# Patient Record
Sex: FEMALE | Race: OTHER | Employment: UNEMPLOYED | URBAN - METROPOLITAN AREA
[De-identification: names, ages, dates, MRNs, and addresses within clinical notes are randomized per-mention and may not be internally consistent; named-entity substitution may affect disease eponyms.]

---

## 2019-10-24 ENCOUNTER — HOSPITAL ENCOUNTER (OUTPATIENT)
Dept: LAB | Age: 24
Discharge: HOME | End: 2019-10-24

## 2019-10-24 PROCEDURE — 87624 HPV HI-RISK TYP POOLED RSLT: CPT

## 2019-10-24 PROCEDURE — 88175 CYTOPATH C/V AUTO FLUID REDO: CPT

## 2019-10-25 ENCOUNTER — HOSPITAL ENCOUNTER (OUTPATIENT)
Dept: LAB | Age: 24
Discharge: HOME | End: 2019-10-25

## 2019-10-25 PROCEDURE — 87491 CHLMYD TRACH DNA AMP PROBE: CPT

## 2019-10-28 LAB
C TRACH DNA SPEC QL NAA+PROBE: NEGATIVE
N GONORRHOEA DNA SPEC QL NAA+PROBE: NEGATIVE
SAMPLE TYPE: NORMAL
SERVICE CMNT-IMP: NORMAL
SPECIMEN SOURCE: NORMAL

## 2020-10-20 ENCOUNTER — HOSPITAL ENCOUNTER (OUTPATIENT)
Dept: LAB | Age: 25
Discharge: HOME | End: 2020-10-20

## 2020-10-20 LAB
ALBUMIN SERPL-MCNC: 3.4 G/DL (ref 3.5–5)
ALBUMIN/GLOB SERPL: 1.1 {RATIO} (ref 1.1–2.2)
ALP SERPL-CCNC: 69 U/L (ref 45–117)
ALT SERPL-CCNC: 24 U/L (ref 12–78)
ANION GAP SERPL CALC-SCNC: 10 MMOL/L (ref 5–15)
AST SERPL-CCNC: 15 U/L (ref 15–37)
BASOPHILS # BLD: 0 K/UL (ref 0–0.1)
BASOPHILS NFR BLD: 1 % (ref 0–1)
BILIRUB SERPL-MCNC: 0.3 MG/DL (ref 0.2–1)
BUN SERPL-MCNC: 12 MG/DL (ref 6–20)
BUN/CREAT SERPL: 17 (ref 12–20)
CALCIUM SERPL-MCNC: 9 MG/DL (ref 8.5–10.1)
CHLORIDE SERPL-SCNC: 107 MMOL/L (ref 97–108)
CO2 SERPL-SCNC: 25 MMOL/L (ref 21–32)
CREAT SERPL-MCNC: 0.71 MG/DL (ref 0.55–1.02)
CRP SERPL HS-MCNC: >9.5 MG/L
DIFFERENTIAL METHOD BLD: NORMAL
EOSINOPHIL # BLD: 0.4 K/UL (ref 0–0.4)
EOSINOPHIL NFR BLD: 7 % (ref 0–7)
ERYTHROCYTE [DISTWIDTH] IN BLOOD BY AUTOMATED COUNT: 13 % (ref 11.5–14.5)
GLOBULIN SER CALC-MCNC: 3.1 G/DL (ref 2–4)
GLUCOSE SERPL-MCNC: 108 MG/DL (ref 65–100)
HCT VFR BLD AUTO: 42.1 % (ref 35–47)
HGB BLD-MCNC: 13.9 G/DL (ref 11.5–16)
IMM GRANULOCYTES # BLD AUTO: 0 K/UL (ref 0–0.04)
IMM GRANULOCYTES NFR BLD AUTO: 0 % (ref 0–0.5)
LYMPHOCYTES # BLD: 2.5 K/UL (ref 0.8–3.5)
LYMPHOCYTES NFR BLD: 41 % (ref 12–49)
MCH RBC QN AUTO: 30.9 PG (ref 26–34)
MCHC RBC AUTO-ENTMCNC: 33 G/DL (ref 30–36.5)
MCV RBC AUTO: 93.6 FL (ref 80–99)
MONOCYTES # BLD: 0.4 K/UL (ref 0–1)
MONOCYTES NFR BLD: 7 % (ref 5–13)
NEUTS SEG # BLD: 2.6 K/UL (ref 1.8–8)
NEUTS SEG NFR BLD: 44 % (ref 32–75)
NRBC # BLD: 0 K/UL (ref 0–0.01)
NRBC BLD-RTO: 0 PER 100 WBC
PLATELET # BLD AUTO: 233 K/UL (ref 150–400)
PMV BLD AUTO: 10.9 FL (ref 8.9–12.9)
POTASSIUM SERPL-SCNC: 3.7 MMOL/L (ref 3.5–5.1)
PROT SERPL-MCNC: 6.5 G/DL (ref 6.4–8.2)
RBC # BLD AUTO: 4.5 M/UL (ref 3.8–5.2)
SODIUM SERPL-SCNC: 142 MMOL/L (ref 136–145)
WBC # BLD AUTO: 6 K/UL (ref 3.6–11)

## 2020-10-20 PROCEDURE — 86038 ANTINUCLEAR ANTIBODIES: CPT

## 2020-10-20 PROCEDURE — 86141 C-REACTIVE PROTEIN HS: CPT

## 2020-10-20 PROCEDURE — 85652 RBC SED RATE AUTOMATED: CPT

## 2020-10-20 PROCEDURE — 85025 COMPLETE CBC W/AUTO DIFF WBC: CPT

## 2020-10-20 PROCEDURE — 80053 COMPREHEN METABOLIC PANEL: CPT

## 2020-10-21 LAB — ERYTHROCYTE [SEDIMENTATION RATE] IN BLOOD: 7 MM/HR (ref 0–20)

## 2020-10-22 LAB — ANA SER QL: NEGATIVE

## 2021-12-30 LAB
ANTIBODY SCREEN, EXTERNAL: NEGATIVE
HBSAG, EXTERNAL: NEGATIVE
RPR, EXTERNAL: NORMAL
RUBELLA, EXTERNAL: NORMAL
TYPE, ABO & RH, EXTERNAL: NORMAL

## 2022-01-06 LAB — HIV, EXTERNAL: NORMAL

## 2022-01-20 ENCOUNTER — HOSPITAL ENCOUNTER (OUTPATIENT)
Dept: PERINATAL CARE | Age: 27
Discharge: HOME | End: 2022-01-20
Attending: OBSTETRICS & GYNECOLOGY

## 2022-01-20 PROCEDURE — 76805 OB US >/= 14 WKS SNGL FETUS: CPT | Performed by: OBSTETRICS & GYNECOLOGY

## 2022-02-24 ENCOUNTER — HOSPITAL ENCOUNTER (OUTPATIENT)
Dept: PERINATAL CARE | Age: 27
Discharge: HOME | End: 2022-02-24
Attending: OBSTETRICS & GYNECOLOGY
Payer: MEDICAID

## 2022-02-24 PROCEDURE — 76805 OB US >/= 14 WKS SNGL FETUS: CPT | Performed by: OBSTETRICS & GYNECOLOGY

## 2022-03-02 PROCEDURE — 87086 URINE CULTURE/COLONY COUNT: CPT

## 2022-03-03 ENCOUNTER — HOSPITAL ENCOUNTER (OUTPATIENT)
Dept: LAB | Age: 27
Discharge: HOME | End: 2022-03-03

## 2022-03-05 LAB
BACTERIA SPEC CULT: NORMAL
SERVICE CMNT-IMP: NORMAL

## 2022-06-22 LAB — GRBS, EXTERNAL: NEGATIVE

## 2022-06-27 ENCOUNTER — INITIAL PRENATAL (OUTPATIENT)
Dept: FAMILY MEDICINE CLINIC | Age: 27
End: 2022-06-27
Payer: MEDICAID

## 2022-06-27 ENCOUNTER — TELEPHONE (OUTPATIENT)
Dept: FAMILY MEDICINE CLINIC | Age: 27
End: 2022-06-27

## 2022-06-27 ENCOUNTER — HOSPITAL ENCOUNTER (OUTPATIENT)
Dept: PERINATAL CARE | Age: 27
Discharge: HOME | End: 2022-06-27
Attending: OBSTETRICS & GYNECOLOGY
Payer: MEDICAID

## 2022-06-27 VITALS
TEMPERATURE: 98.4 F | DIASTOLIC BLOOD PRESSURE: 63 MMHG | WEIGHT: 153 LBS | HEART RATE: 97 BPM | HEIGHT: 60 IN | BODY MASS INDEX: 30.04 KG/M2 | RESPIRATION RATE: 16 BRPM | SYSTOLIC BLOOD PRESSURE: 98 MMHG | OXYGEN SATURATION: 98 %

## 2022-06-27 DIAGNOSIS — Z3A.37 37 WEEKS GESTATION OF PREGNANCY: Primary | ICD-10-CM

## 2022-06-27 DIAGNOSIS — O26.843 UTERINE SIZE-DATE DISCREPANCY IN THIRD TRIMESTER: ICD-10-CM

## 2022-06-27 PROCEDURE — 76816 OB US FOLLOW-UP PER FETUS: CPT | Performed by: OBSTETRICS & GYNECOLOGY

## 2022-06-27 PROCEDURE — 0500F INITIAL PRENATAL CARE VISIT: CPT | Performed by: STUDENT IN AN ORGANIZED HEALTH CARE EDUCATION/TRAINING PROGRAM

## 2022-06-27 NOTE — PROGRESS NOTES
I reviewed with the resident the medical history and the resident's findings on the physical examination. I discussed with the resident the patient's diagnosis and concur with the plan. 27yo  @ 37w0d   1. IUP: Rh pos, GTT wnl, GBS neg, vx by paola   2. Transfer of care: at 37 wk from [facility]  3. S<D: had MFM scan today, pending, will f/up on that.   Apparently her two other children were both 10#

## 2022-06-27 NOTE — PROGRESS NOTES
Identified pt with two pt identifiers(name and ). Reviewed record in preparation for visit and have obtained necessary documentation. Chief Complaint   Patient presents with    Initial Prenatal Visit     , no bleeding, no lof, patient states she feels baby move alot        Health Maintenance Due   Topic    Hepatitis C Screening     Depression Screen     COVID-19 Vaccine (1)    DTaP/Tdap/Td series (1 - Tdap)       Visit Vitals  BP 98/63 (BP 1 Location: Left upper arm, BP Patient Position: Sitting, BP Cuff Size: Adult)   Pulse 97   Temp 98.4 °F (36.9 °C) (Temporal)   Resp 16   Ht 5' (1.524 m)   Wt 153 lb (69.4 kg)   SpO2 98%   BMI 29.88 kg/m²             Patient is accompanied by self I have received verbal consent from Kasey Cogan to discuss any/all medical information while they are present in the room.

## 2022-06-27 NOTE — PROGRESS NOTES
Return OB Visit       Subjective:   Annmarie Padgett 27 y.o.   DANIELA: 2022. GA:  37w0d by LMP c/w 14 week US. AMN interp# [id number] used due to language barrier. LOF: none  Vaginal bleeding: none  Fetal movement (after 20 weeks): yes   Contractions: intermittent, sporadic     She has no concerns today. She is transferring care from [facility]. Her pregnancy has been complicated by S<D. She was referred for an ultrasound at [facility] today and was told the baby looks normal and to follow up prn. Otherwise her pregnancy has been uncomplicated. Allergies- reviewed:   No Known Allergies  Medications- reviewed:   Current Outpatient Medications   Medication Sig    PNV TQ.02/ARWOQFX fum/folic ac (PRENATAL PO) Take  by mouth. No current facility-administered medications for this visit. Past Medical History- reviewed:  History reviewed. No pertinent past medical history. Past Surgical History- reviewed:   History reviewed. No pertinent surgical history. Social History- reviewed:  Social History     Socioeconomic History    Marital status: UNKNOWN     Spouse name: Not on file    Number of children: Not on file    Years of education: Not on file    Highest education level: Not on file   Occupational History    Not on file   Tobacco Use    Smoking status: Never Smoker    Smokeless tobacco: Never Used   Substance and Sexual Activity    Alcohol use: Never    Drug use: Never    Sexual activity: Yes     Partners: Male   Other Topics Concern    Not on file   Social History Narrative    Not on file     Social Determinants of Health     Financial Resource Strain:     Difficulty of Paying Living Expenses: Not on file   Food Insecurity:     Worried About Running Out of Food in the Last Year: Not on file    Tanja of Food in the Last Year: Not on file   Transportation Needs:     Lack of Transportation (Medical): Not on file    Lack of Transportation (Non-Medical):  Not on file Physical Activity:     Days of Exercise per Week: Not on file    Minutes of Exercise per Session: Not on file   Stress:     Feeling of Stress : Not on file   Social Connections:     Frequency of Communication with Friends and Family: Not on file    Frequency of Social Gatherings with Friends and Family: Not on file    Attends Druze Services: Not on file    Active Member of [address] [place] [address] or Organizations: Not on file    Attends Club or Organization Meetings: Not on file    Marital Status: Not on file   Intimate Partner Violence:     Fear of Current or Ex-Partner: Not on file    Emotionally Abused: Not on file    Physically Abused: Not on file    Sexually Abused: Not on file   Housing Stability:     Unable to Pay for Housing in the Last Year: Not on file    Number of [place] in the Last Year: Not on file    Unstable Housing in the Last Year: Not on file     Immunizations- reviewed:   Immunization History   Administered Date(s) Administered    COVID-19, Moderna Booster, PF, 0.25mL Dose 2022    COVID-19, Marvin George, Primary or Immunocompromised Series, MRNA, PF, 100mcg/0.5mL 10/28/2021, 2021    Tdap 2022       Objective:     Visit Vitals  BP 98/63 (BP 1 Location: Left upper arm, BP Patient Position: Sitting, BP Cuff Size: Adult)   Pulse 97   Temp 98.4 °F (36.9 °C) (Temporal)   Resp 16   Ht 5' (1.524 m)   Wt 153 lb (69.4 kg)   LMP 10/11/2021 (Exact Date)   SpO2 98%   BMI 29.88 kg/m²     Physical Exam:  GENERAL APPEARANCE: alert, well appearing, in no apparent distress  ABDOMEN: gravid, fundal height 34 cm, FHT present at 135 bpm  PSYCH: normal mood and affect           Labs  No results found for this or any previous visit (from the past 12 hour(s)). Assessment       ICD-10-CM ICD-9-CM    1. 37 weeks gestation of pregnancy  Z3A.37 V22.2    2.  Uterine size-date discrepancy in third trimester  O26.843 649.63        Plan   27 y.o.  at 37w0d by LMP c/w 14 week US. here for return OB visit     1. IUP: O+, Ab screen neg, hep B neg, RPR NR, HIV NR, GC/Chlamydia neg, Hg 13.5, platelets 963, ur culture neg, AFP neg. 3rd tri Hg 12.8, 1hr GTT normal, pap 12/2021 normal  - Patient [facility] labs and paperwork [facility]. The forms have been placed in the in-house scan pile   - Previously declined genetic testing  - Cephalic on bedside US today, will defer cervical check until next visit  - GBS: neg   - Continue PNV  2. S<D:   - Growth scan done today and results scanned in to media, baby is 38th percentile and everything appears normal. No follow up is recommended      Orders Placed This Encounter    PNV KD.56/MLTPKLC fum/folic ac (PRENATAL PO)     Sig: Take  by mouth. Labor precautions discussed, including: Regular painful contractions, lasting for greater than one hour, taking your breath away; any vaginal bleeding; any leakage of fluid; or absent or decreased fetal movement. Call M.D. on call if any of these symptoms or signs occur. I have discussed the diagnosis with the patient and the intended plan as seen in the above orders. The patient has received an after-visit summary and questions were answered concerning future plans. I have discussed medication side effects and warnings with the patient as well. Informed pt to return to the office or go to the ER if she experiences vaginal bleeding, vaginal discharge, leaking of fluid, pelvic cramping.     Patient discussed with Dr. Mallory Nguyen (attending physician)    Mitali Allison DO  Family Medicine Resident

## 2022-06-27 NOTE — PATIENT INSTRUCTIONS
Semana 40 de mullins embarazo: Instrucciones de cuidado  Week 37 of Your Pregnancy: Care Instructions  Instrucciones de cuidado     Usted está cerca del final de mullins embarazo, y probablemente esté bastante incómoda. Puede ser más difícil caminar. Acostarse probablemente tampoco sea cómodo. Podría tener dificultad para dormir o para permanecer dormida. La mayoría de las mujeres ahsan a bao entre las 40 y 43 semanas. Alisa es un buen momento para pensar en preparar un maletín para el hospital con los artículos que necesitará. Entonces estará lista para cuando comience el [place] get Allison. La atención de seguimiento es loren parte clave de mullins tratamiento y seguridad. Asegúrese de hacer y acudir a todas las citas, y llame a mullins médico si está teniendo problemas. También es loren buena idea saber los resultados de jax exámenes y mantener loren lista de los medicamentos que elan. ¿Cómo puede cuidarse en el hogar? Aprenda sobre el amamantamiento  · El amamantamiento es lo mejor para mullins bebé y [place] es kelley para usted. · La leche materna tiene anticuerpos que ayudan al bebé a combatir las infecciones. · Las madres que amamantan suelen bajar de peso más rápidamente, debido a que elaborar leche quema calorías. · Informarse acerca de las mejores maneras de sostener a mullins bebé le facilitará el amamantamiento. · Deje que mullins michelle bañe y [employer] pañales del bebé para que no se sienta excluida. Acurrúquense juntos cuando amamante a mullins bebé. · Es posible que desee aprender a usar un sacaleches y guardar mullins [facility]. · Si elige alimentar a mullins bebé con biberón, hágalo de la [employer] resulte más parecida al amamantamiento para que pueda establecer un vínculo con mullins bebé. Siempre sostenga al bebé y el biberón. No apoye el biberón ni deje que mullins bebé se quede dormido con él. Aprenda sobre el llanto  · Es normal que los bebés lloren de 1 a 3 horas al día. Algunos lloran más, otros menos.   · Los bebés no lloran para causarle molestias ni porque usted sea [facility]. · Llorar es la forma de comunicarse que tiene el bebé. Mullins bebé puede [employer] o gases, necesitar un cambio de pañal, sentir frío o calor, sentirse solo o tenso. A veces, los bebés lloran por motivos desconocidos. · Si usted responde a las necesidades de mullins bebé, mary aprenderá a confiar en usted. · Intente mantener la calma cuando mlulins bebé llore. Mullins bebé se puede sentir más molesto si siente que usted [place]. Sepa cómo cuidar a mullins recién nacido  · El muñón del cordón umbilical de mullins bebé se caerá solo, por lo general entre las semanas 1 y 2. Para cuidar la jamee del cordón umbilical de mullins bebé:  ? Limpie la jamee de la parte inferior del cordón umbilical 2 o 3 veces al día. ? Ponga especial atención en la jamee en donde el cordón se fija a la piel. ? Mantenga el pañal doblado debajo del cordón. ? Utilice loren toallita húmeda o algodón para darle un baño de esponja a mullins bebé hasta que se le haya caído el muñón. · La primera evacuación intestinal oscura de mullins bebé se conoce abimael meconio. Después del meconio, el bebé tendrá jax propios hábitos de evacuación intestinal.  ? Algunos bebés, especialmente aquellos que se alimentan con [address], tienen varias evacuaciones al día. Otros tienen [employer] al día, o loren cada 2 o 3 días. ? Los bebés que son amamantados a menudo tienen evacuaciones sueltas amarillentas. Los bebés que se alimentan con leche de fórmula evacuan heces más sólidas. ? Si mullins bebé tiene evacuaciones abimael bolitas pequeñas, está estreñido. Después de 2 zoran de estreñimiento, llame al médico de mullins bebé. · Si mullins bebé va a ser [employer], usted puede cuidarlo en el hogar. ? Enjuáguele delicadamente el pene con agua tibia cada vez que le cambie los pañales. No intente retirar la membrana que se forma sobre el pene. Esta membrana desaparecerá por sí antonina. Séquele la jamee con toques suaves de toalla.   ? [employer] a base de petróleo, abimael vaselina, en la jamee del pañal que tendrá contacto con el pene de mullins bebé. [place] evitará que el pañal se le pegue al bebé. ? Pregúntele al médico acerca de darle acetaminofén (Tylenol) a mullins bebé para el dolor. ¿Dónde puede encontrar más información en inglés? Vaya a http://www.Mocavo.com/  Natalia Chen [id number] en la búsqueda para aprender más acerca de \"Semana 40 de mullins embarazo: Instrucciones de cuidado. \"  Revisado: 16 junio, 2021               Versión del contenido: 13.2  © 5602-0773 Healthwise, Incorporated. Las instrucciones de cuidado fueron adaptadas bajo licencia por [employer] (which disclaims liability or warranty for this information). Si usted tiene Sampson Vernal afección médica o sobre estas instrucciones, siempre pregunte a mullins profesional de ulises. Healthwise, Incorporated niega toda garantía o responsabilidad por mullins uso de esta información.

## 2022-06-27 NOTE — TELEPHONE ENCOUNTER
Pt called will be late 10-15mins from appt time. On her way now, but it will take little time due to rain.  Ask nurse more and confirms its fine to come in 15-20 mins late as long as its not no show      -Verona Iraheta

## 2022-07-03 ENCOUNTER — HOSPITAL ENCOUNTER (INPATIENT)
Age: 27
LOS: 2 days | Discharge: HOME | DRG: 542 | End: 2022-07-05
Attending: FAMILY MEDICINE | Admitting: OBSTETRICS & GYNECOLOGY
Payer: MEDICAID

## 2022-07-03 DIAGNOSIS — O26.843 UTERINE SIZE-DATE DISCREPANCY IN THIRD TRIMESTER: ICD-10-CM

## 2022-07-03 LAB
ABO + RH BLD: NORMAL
APTT PPP: 28.8 SEC (ref 22.1–31)
BASOPHILS # BLD: 0 K/UL (ref 0–0.1)
BASOPHILS # BLD: 0 K/UL (ref 0–0.1)
BASOPHILS NFR BLD: 0 % (ref 0–1)
BASOPHILS NFR BLD: 0 % (ref 0–1)
BLOOD GROUP ANTIBODIES SERPL: NORMAL
DIFFERENTIAL METHOD BLD: ABNORMAL
DIFFERENTIAL METHOD BLD: ABNORMAL
EOSINOPHIL # BLD: 0 K/UL (ref 0–0.4)
EOSINOPHIL # BLD: 0.1 K/UL (ref 0–0.4)
EOSINOPHIL NFR BLD: 0 % (ref 0–7)
EOSINOPHIL NFR BLD: 1 % (ref 0–7)
ERYTHROCYTE [DISTWIDTH] IN BLOOD BY AUTOMATED COUNT: 14.4 % (ref 11.5–14.5)
ERYTHROCYTE [DISTWIDTH] IN BLOOD BY AUTOMATED COUNT: 14.4 % (ref 11.5–14.5)
FIBRINOGEN PPP-MCNC: 355 MG/DL (ref 200–475)
HCT VFR BLD AUTO: 37.4 % (ref 35–47)
HCT VFR BLD AUTO: 39 % (ref 35–47)
HGB BLD-MCNC: 13.1 G/DL (ref 11.5–16)
HGB BLD-MCNC: 13.7 G/DL (ref 11.5–16)
IMM GRANULOCYTES # BLD AUTO: 0.1 K/UL (ref 0–0.04)
IMM GRANULOCYTES # BLD AUTO: 0.1 K/UL (ref 0–0.04)
IMM GRANULOCYTES NFR BLD AUTO: 1 % (ref 0–0.5)
IMM GRANULOCYTES NFR BLD AUTO: 1 % (ref 0–0.5)
INR PPP: 0.9 (ref 0.9–1.1)
LYMPHOCYTES # BLD: 1.9 K/UL (ref 0.8–3.5)
LYMPHOCYTES # BLD: 2.3 K/UL (ref 0.8–3.5)
LYMPHOCYTES NFR BLD: 12 % (ref 12–49)
LYMPHOCYTES NFR BLD: 23 % (ref 12–49)
MCH RBC QN AUTO: 31.3 PG (ref 26–34)
MCH RBC QN AUTO: 31.4 PG (ref 26–34)
MCHC RBC AUTO-ENTMCNC: 35 G/DL (ref 30–36.5)
MCHC RBC AUTO-ENTMCNC: 35.1 G/DL (ref 30–36.5)
MCV RBC AUTO: 89.2 FL (ref 80–99)
MCV RBC AUTO: 89.3 FL (ref 80–99)
MONOCYTES # BLD: 0.7 K/UL (ref 0–1)
MONOCYTES # BLD: 0.9 K/UL (ref 0–1)
MONOCYTES NFR BLD: 6 % (ref 5–13)
MONOCYTES NFR BLD: 8 % (ref 5–13)
NEUTS SEG # BLD: 13.6 K/UL (ref 1.8–8)
NEUTS SEG # BLD: 6.4 K/UL (ref 1.8–8)
NEUTS SEG NFR BLD: 67 % (ref 32–75)
NEUTS SEG NFR BLD: 81 % (ref 32–75)
NRBC # BLD: 0 K/UL (ref 0–0.01)
NRBC # BLD: 0 K/UL (ref 0–0.01)
NRBC BLD-RTO: 0 PER 100 WBC
NRBC BLD-RTO: 0 PER 100 WBC
PLATELET # BLD AUTO: 195 K/UL (ref 150–400)
PLATELET # BLD AUTO: 201 K/UL (ref 150–400)
PMV BLD AUTO: 10 FL (ref 8.9–12.9)
PMV BLD AUTO: 10 FL (ref 8.9–12.9)
PROTHROMBIN TIME: 9.9 SEC (ref 9–11.1)
RBC # BLD AUTO: 4.19 M/UL (ref 3.8–5.2)
RBC # BLD AUTO: 4.37 M/UL (ref 3.8–5.2)
SPECIMEN EXP DATE BLD: NORMAL
THERAPEUTIC RANGE,PTTT: NORMAL SECS (ref 58–77)
WBC # BLD AUTO: 16.6 K/UL (ref 3.6–11)
WBC # BLD AUTO: 9.6 K/UL (ref 3.6–11)

## 2022-07-03 PROCEDURE — 0W3R7ZZ CONTROL BLEEDING IN GENITOURINARY TRACT, VIA NATURAL OR ARTIFICIAL OPENING: ICD-10-PCS | Performed by: NURSE PRACTITIONER

## 2022-07-03 PROCEDURE — 85025 COMPLETE CBC W/AUTO DIFF WBC: CPT

## 2022-07-03 PROCEDURE — 74011000250 HC RX REV CODE- 250: Performed by: OBSTETRICS & GYNECOLOGY

## 2022-07-03 PROCEDURE — 75410000002 HC LABOR FEE PER 1 HR: Performed by: NURSE PRACTITIONER

## 2022-07-03 PROCEDURE — 77030040831 HC BAG URINE DRNG MDII -A

## 2022-07-03 PROCEDURE — 75410000000 HC DELIVERY VAGINAL/SINGLE: Performed by: NURSE PRACTITIONER

## 2022-07-03 PROCEDURE — 85730 THROMBOPLASTIN TIME PARTIAL: CPT

## 2022-07-03 PROCEDURE — 75410000003 HC RECOV DEL/VAG/CSECN EA 0.5 HR: Performed by: NURSE PRACTITIONER

## 2022-07-03 PROCEDURE — 59025 FETAL NON-STRESS TEST: CPT

## 2022-07-03 PROCEDURE — 85610 PROTHROMBIN TIME: CPT

## 2022-07-03 PROCEDURE — 86900 BLOOD TYPING SEROLOGIC ABO: CPT

## 2022-07-03 PROCEDURE — 75810000275 HC EMERGENCY DEPT VISIT NO LEVEL OF CARE

## 2022-07-03 PROCEDURE — 3E033VJ INTRODUCTION OF OTHER HORMONE INTO PERIPHERAL VEIN, PERCUTANEOUS APPROACH: ICD-10-PCS | Performed by: NURSE PRACTITIONER

## 2022-07-03 PROCEDURE — 85384 FIBRINOGEN ACTIVITY: CPT

## 2022-07-03 PROCEDURE — 74011000250 HC RX REV CODE- 250: Performed by: NURSE PRACTITIONER

## 2022-07-03 PROCEDURE — 74011250637 HC RX REV CODE- 250/637: Performed by: OBSTETRICS & GYNECOLOGY

## 2022-07-03 PROCEDURE — 77030005513 HC CATH URETH FOL11 MDII -B

## 2022-07-03 PROCEDURE — 36415 COLL VENOUS BLD VENIPUNCTURE: CPT

## 2022-07-03 PROCEDURE — 99284 EMERGENCY DEPT VISIT MOD MDM: CPT

## 2022-07-03 PROCEDURE — C1726 CATH, BAL DIL, NON-VASCULAR: HCPCS

## 2022-07-03 PROCEDURE — 65270000029 HC RM PRIVATE

## 2022-07-03 PROCEDURE — 65410000002 HC RM PRIVATE OB

## 2022-07-03 PROCEDURE — 74011250636 HC RX REV CODE- 250/636: Performed by: NURSE PRACTITIONER

## 2022-07-03 PROCEDURE — 74011250637 HC RX REV CODE- 250/637: Performed by: STUDENT IN AN ORGANIZED HEALTH CARE EDUCATION/TRAINING PROGRAM

## 2022-07-03 PROCEDURE — 74011250636 HC RX REV CODE- 250/636: Performed by: STUDENT IN AN ORGANIZED HEALTH CARE EDUCATION/TRAINING PROGRAM

## 2022-07-03 RX ORDER — SODIUM CHLORIDE, SODIUM LACTATE, POTASSIUM CHLORIDE, CALCIUM CHLORIDE 600; 310; 30; 20 MG/100ML; MG/100ML; MG/100ML; MG/100ML
125 INJECTION, SOLUTION INTRAVENOUS CONTINUOUS
Status: CANCELLED | OUTPATIENT
Start: 2022-07-03

## 2022-07-03 RX ORDER — FENTANYL CITRATE 50 UG/ML
50 INJECTION, SOLUTION INTRAMUSCULAR; INTRAVENOUS ONCE
Status: COMPLETED | OUTPATIENT
Start: 2022-07-03 | End: 2022-07-03

## 2022-07-03 RX ORDER — ONDANSETRON 2 MG/ML
4 INJECTION INTRAMUSCULAR; INTRAVENOUS
Status: DISCONTINUED | OUTPATIENT
Start: 2022-07-03 | End: 2022-07-04 | Stop reason: HOSPADM

## 2022-07-03 RX ORDER — OXYTOCIN/RINGER'S LACTATE 30/500 ML
10 PLASTIC BAG, INJECTION (ML) INTRAVENOUS AS NEEDED
Status: DISCONTINUED | OUTPATIENT
Start: 2022-07-03 | End: 2022-07-05 | Stop reason: HOSPADM

## 2022-07-03 RX ORDER — FENTANYL CITRATE 50 UG/ML
50 INJECTION, SOLUTION INTRAMUSCULAR; INTRAVENOUS
Status: COMPLETED | OUTPATIENT
Start: 2022-07-03 | End: 2022-07-03

## 2022-07-03 RX ORDER — OXYTOCIN/RINGER'S LACTATE 30/500 ML
87.3 PLASTIC BAG, INJECTION (ML) INTRAVENOUS AS NEEDED
Status: CANCELLED | OUTPATIENT
Start: 2022-07-03

## 2022-07-03 RX ORDER — OXYTOCIN/RINGER'S LACTATE 30/500 ML
PLASTIC BAG, INJECTION (ML) INTRAVENOUS
Status: DISPENSED
Start: 2022-07-03 | End: 2022-07-04

## 2022-07-03 RX ORDER — OXYTOCIN/RINGER'S LACTATE 30/500 ML
87.3 PLASTIC BAG, INJECTION (ML) INTRAVENOUS AS NEEDED
Status: DISCONTINUED | OUTPATIENT
Start: 2022-07-03 | End: 2022-07-05 | Stop reason: HOSPADM

## 2022-07-03 RX ORDER — TRANEXAMIC ACID 100 MG/ML
1 INJECTION, SOLUTION INTRAVENOUS ONCE
Status: COMPLETED | OUTPATIENT
Start: 2022-07-03 | End: 2022-07-03

## 2022-07-03 RX ORDER — OXYTOCIN/RINGER'S LACTATE 30/500 ML
10 PLASTIC BAG, INJECTION (ML) INTRAVENOUS AS NEEDED
Status: CANCELLED | OUTPATIENT
Start: 2022-07-03

## 2022-07-03 RX ORDER — SODIUM CHLORIDE 0.9 % (FLUSH) 0.9 %
5-40 SYRINGE (ML) INJECTION AS NEEDED
Status: DISCONTINUED | OUTPATIENT
Start: 2022-07-03 | End: 2022-07-05 | Stop reason: HOSPADM

## 2022-07-03 RX ORDER — HYDROCORTISONE ACETATE PRAMOXINE HCL 2.5; 1 G/100G; G/100G
CREAM TOPICAL AS NEEDED
Status: DISCONTINUED | OUTPATIENT
Start: 2022-07-03 | End: 2022-07-03

## 2022-07-03 RX ORDER — MAG HYDROX/ALUMINUM HYD/SIMETH 200-200-20
30 SUSPENSION, ORAL (FINAL DOSE FORM) ORAL
Status: DISCONTINUED | OUTPATIENT
Start: 2022-07-03 | End: 2022-07-04 | Stop reason: HOSPADM

## 2022-07-03 RX ORDER — SODIUM CHLORIDE 0.9 % (FLUSH) 0.9 %
5-40 SYRINGE (ML) INJECTION AS NEEDED
Status: CANCELLED | OUTPATIENT
Start: 2022-07-03

## 2022-07-03 RX ORDER — IBUPROFEN 800 MG/1
800 TABLET ORAL EVERY 8 HOURS
Status: DISCONTINUED | OUTPATIENT
Start: 2022-07-03 | End: 2022-07-04 | Stop reason: HOSPADM

## 2022-07-03 RX ORDER — MISOPROSTOL 200 UG/1
800 TABLET ORAL ONCE
Status: COMPLETED | OUTPATIENT
Start: 2022-07-03 | End: 2022-07-03

## 2022-07-03 RX ORDER — SODIUM CHLORIDE 0.9 % (FLUSH) 0.9 %
5-40 SYRINGE (ML) INJECTION EVERY 8 HOURS
Status: CANCELLED | OUTPATIENT
Start: 2022-07-03

## 2022-07-03 RX ORDER — NALOXONE HYDROCHLORIDE 0.4 MG/ML
0.4 INJECTION, SOLUTION INTRAMUSCULAR; INTRAVENOUS; SUBCUTANEOUS AS NEEDED
Status: DISCONTINUED | OUTPATIENT
Start: 2022-07-03 | End: 2022-07-04 | Stop reason: HOSPADM

## 2022-07-03 RX ORDER — SODIUM CHLORIDE 0.9 % (FLUSH) 0.9 %
5-40 SYRINGE (ML) INJECTION EVERY 8 HOURS
Status: DISCONTINUED | OUTPATIENT
Start: 2022-07-03 | End: 2022-07-05 | Stop reason: HOSPADM

## 2022-07-03 RX ORDER — LIDOCAINE HYDROCHLORIDE 10 MG/ML
10 INJECTION INFILTRATION; PERINEURAL AS NEEDED
Status: DISCONTINUED | OUTPATIENT
Start: 2022-07-03 | End: 2022-07-04 | Stop reason: HOSPADM

## 2022-07-03 RX ORDER — METHYLERGONOVINE MALEATE 0.2 MG/ML
0.2 INJECTION INTRAVENOUS ONCE
Status: COMPLETED | OUTPATIENT
Start: 2022-07-03 | End: 2022-07-03

## 2022-07-03 RX ORDER — OXYTOCIN/RINGER'S LACTATE 30/500 ML
500 PLASTIC BAG, INJECTION (ML) INTRAVENOUS CONTINUOUS
Status: DISCONTINUED | OUTPATIENT
Start: 2022-07-03 | End: 2022-07-05

## 2022-07-03 RX ORDER — SODIUM CHLORIDE, SODIUM LACTATE, POTASSIUM CHLORIDE, CALCIUM CHLORIDE 600; 310; 30; 20 MG/100ML; MG/100ML; MG/100ML; MG/100ML
125 INJECTION, SOLUTION INTRAVENOUS CONTINUOUS
Status: DISCONTINUED | OUTPATIENT
Start: 2022-07-03 | End: 2022-07-05

## 2022-07-03 RX ORDER — IBUPROFEN 400 MG/1
400 TABLET ORAL
Status: DISCONTINUED | OUTPATIENT
Start: 2022-07-03 | End: 2022-07-03

## 2022-07-03 RX ORDER — TRANEXAMIC ACID 100 MG/ML
INJECTION, SOLUTION INTRAVENOUS
Status: DISPENSED
Start: 2022-07-03 | End: 2022-07-04

## 2022-07-03 RX ADMIN — CEFAZOLIN 2 G: 1 INJECTION, POWDER, FOR SOLUTION INTRAMUSCULAR; INTRAVENOUS at 17:13

## 2022-07-03 RX ADMIN — OXYTOCIN 125 ML/HR: 10 INJECTION INTRAVENOUS at 15:38

## 2022-07-03 RX ADMIN — METHYLERGONOVINE MALEATE 0.2 MG: 0.2 INJECTION, SOLUTION INTRAMUSCULAR; INTRAVENOUS at 16:13

## 2022-07-03 RX ADMIN — TRANEXAMIC ACID 1000 MG: 1 INJECTION, SOLUTION INTRAVENOUS at 16:52

## 2022-07-03 RX ADMIN — IBUPROFEN 800 MG: 800 TABLET, FILM COATED ORAL at 14:47

## 2022-07-03 RX ADMIN — IBUPROFEN 800 MG: 800 TABLET, FILM COATED ORAL at 23:13

## 2022-07-03 RX ADMIN — TRANEXAMIC ACID 1000 MG: 1 INJECTION, SOLUTION INTRAVENOUS at 16:19

## 2022-07-03 RX ADMIN — FENTANYL CITRATE 50 MCG: 50 INJECTION INTRAMUSCULAR; INTRAVENOUS at 16:34

## 2022-07-03 RX ADMIN — FENTANYL CITRATE 50 MCG: 50 INJECTION INTRAMUSCULAR; INTRAVENOUS at 15:27

## 2022-07-03 RX ADMIN — MISOPROSTOL 800 MCG: 200 TABLET ORAL at 16:19

## 2022-07-03 RX ADMIN — OXYTOCIN 999 ML/HR: 10 INJECTION INTRAVENOUS at 14:13

## 2022-07-03 RX ADMIN — SODIUM CHLORIDE, POTASSIUM CHLORIDE, SODIUM LACTATE AND CALCIUM CHLORIDE 125 ML/HR: 600; 310; 30; 20 INJECTION, SOLUTION INTRAVENOUS at 17:07

## 2022-07-03 NOTE — PROGRESS NOTES
1200: Abbi Burciaga patient can come off of EFM to ambulate hallway at this time and be reassessed at 1330.

## 2022-07-03 NOTE — PROGRESS NOTES
1120: Pt reports to L&D room 206 from ED for complaint of contractions. Medudem  being used. Pt reports contractions began last night around 8 pm and are currently every few minutes. Denies vaginal bleeding, LOF, HA, vision changes. 1130: SVE per Haresh HAAS, 4/60/-2. Gadsden Bloodgood notified of pt arrival, contractions, and SVE.

## 2022-07-03 NOTE — L&D DELIVERY NOTE
Delivery Summary    Patient: Ira Jiménez MRN: [id number]  SSN: [id number]    YOB: 1995  Age: 27 y.o. Sex: female      Patient progressed well and pushed for approximately 15-30 min w/  of a liveborn female infant, Apgar scores were 9/9. Head delivered slightly MILTON. No nuchal cord. Anterior left shoulder delivered w/ single maternal effort w/ posterior shoulder and body following easily. Infant placed on maternal abdomen. Delayed cord clamping x 2 min. Cord clamped x 2 and cut by Dr. Paulo Reaves. Placenta delivered spontaneously intact w/ 3 v cord. Pitocin infusion initiated Perineum inspected. Fundus firm at U. Mother and baby bonding in LDR. Delivery . Delivery attended by C. Lynnie Claude, MD- family practice resident. Information for the patient's :  Elena Maradiaga, Female [[id number]]       Labor Events:    Labor: No    Steroids: None   Cervical Ripening Date/Time:       Cervical Ripening Type: None   Antibiotics During Labor:     Rupture Identifier:      Rupture Date/Time: 7/3/2022 1:50 PM   Rupture Type: AROM   Amniotic Fluid Volume: Moderate    Amniotic Fluid Description: Clear    Amniotic Fluid Odor: None    Induction: None       Induction Date/Time:        Indications for Induction:      Augmentation: None   Augmentation Date/Time:      Indications for Augmentation:     Labor complications:          Additional complications:        Delivery Events:  Indications For Episiotomy:     Episiotomy: None   Perineal Laceration(s): None   Repaired:     Periurethral Laceration Location:      Repaired:     Labial Laceration Location:     Repaired:     Sulcal Laceration Location:     Repaired:     Vaginal Laceration Location:     Repaired:     Cervical Laceration Location:     Repaired:     Repair Suture: None   Number of Repair Packets:     Estimated Blood Loss (ml):  ml   Quantitative Blood Loss (ml)                Delivery Date: 7/3/2022 Delivery Time: 2:01 PM  Delivery Type: Vaginal, Spontaneous  Sex:  Female    Gestational Age: 41w10d   Delivery Clinician:  Toney Doty  Living Status: Living   Delivery Location: L&D            APGARS  One minute Five minutes Ten minutes   Skin color: 1   1        Heart rate: 2   2        Grimace: 2   2        Muscle tone: 2   2        Breathin   2        Totals: 9   9            Presentation: Vertex    Position: Left Occiput Anterior  Resuscitation Method:  Tactile Stimulation;Suctioning-bulb     Meconium Stained: None      Cord Information: 3 Vessels  Complications: None  Cord around:    Delayed cord clamping? Yes  Cord clamped date/time:7/3/2022  2:04 PM  Disposition of Cord Blood: Lab    Blood Gases Sent?: No    Placenta:  Date/Time: 7/3/2022  2:13 PM  Removal: Expressed      Appearance: Normal     Comins Measurements:  Birth Weight:        Birth Length:        Head Circumference:        Chest Circumference:       Abdominal Girth: Other Providers:   VIKTORIA AVILA;DUKE MUSTAFA;JANAY CLAYTON;ISH MACK;KYLE MOELLER;KARI PRICE;TIA GIPSON;VIDHI MENDOZA;JES JERONIMO, Obstetrician;Primary Nurse;Primary  Nurse;Nursery Nurse;Midwife; Resident; Resident;Staff Nurse;Staff Nurse           Group B Strep: No results found for: KOLBY Alexander  Information for the patient's :  Eliu Downs, Female [[id number]]   No results found for: AHMET Rodríguez, BILI, ABORHEXT, ABORH

## 2022-07-03 NOTE — PROGRESS NOTES
1700 Bedside and Verbal shift change report given to [address] (oncoming nurse) by Merlyn Ames (offgoing nurse). Report included the following information SBAR, Kardex, Procedure Summary, Intake/Output, MAR, Accordion, Recent Results and Med Rec Status. 1737 Patient is resting, vital signs within normal limits. 1900 Bedside and Verbal shift change report given to [address] (oncoming nurse) by Adria Spatz RN (offgoing nurse). Report included the following information SBAR, Kardex, Procedure Summary, Intake/Output, MAR, Accordion, Recent Results and Med Rec Status.

## 2022-07-03 NOTE — PROGRESS NOTES
1220:  This RN overseeing care of pt at this time. 1328: Cuban interpretor turned on for assessment, and education and interventions. SVE per EDUARDO WARNERW. Pt 9/-2, intact. 1349: SVE per EDUARDO Pack- AROM at this time d/t BBROM, clear/mod amount noted. Pt placed in foot pedals at this time. 1350: Patient actively pushing. RN remains in continuous attendance at the bedside. Assessment & evaluation of fetal heart rate ongoing via continuous EFM.    1401:  of female  at this time with EDUARDO Pack and resident team.     [phone]: Delivery of placenta at this time. 1447: Motrin given at this time. 1527: Pt given 50mcg IV fentanyl at this time per Dr. Bailee Medellin order. Cuban interpretor used for medication education, and for education on manual sweep. 1531: RN asking for EDUARDO Curran Reading for bedside eval for vaginal bleeding. Manual sweep by EDUARDO Pack at this time- clots/ possible membranes removed during sweep. Current QBL: 391ml. 1538: 2nd bag of pitocin being admin at this time. 1605: this RN perfoming fundal assessment. Large amount of clots expressed. Evette Gregory RN called to bedside, more clots expressed. Adrian Espinal called to bedside to assess bleeding. Blood loss: ~560ml.     1608: EDUARDO Pack at bedside to eval bleeding. Per CNW- call Dr. Nessa Smiley to bedside to perform US.     4866: methergine 0.2mg given in L thigh per EDUARDO [place]. 1615: US performed, man sweep by Dr. Nessa Smiley. Tyrese Zelaya RN at bedside to assist with translating for pt.     1618: Cloud cath placed at this time per Dr. Lillian Ahumada Draining, patent. 1619: Miso 800mcg amdin rectally by EDUARDO Pack CNW.     9137: 50mcg fentanyl admin at this time prior to manual sweep for pt comfort. 1638: Man sweep being performed by Dr. Nessa Smiley, clots expressed. 1640: Bakri being placed at this time by Dr. Nessa Smiley. 200ml.    1652: second dose of TXA being admi at this time per Dr. Lillian Trent.      1700: SBAR given to Rosette Knight RN at this time.

## 2022-07-03 NOTE — H&P
[address] [facility] [address] [place] [address]   Office [phone], Fax [phone]      History & Physical    Name: Madalyn Santana MRN: [id number]  SSN: [id number]    YOB: 1995  Age: 27 y.o. Sex: female      Subjective:     Reason for Admission:  Pregnancy and Rule out of labor    History of Present Illness: Ms. Sisi Denis is a 27 y.o.   female with an estimated gestational age of 41w10d with Estimated Date of Delivery: 22. Patient complains of contractions that started yesterday at 8:30pm. Every 5 minutes. Lasts 30sec - one minute. Contractions worsened today at 3am. Negative for LOF, vaginal bleeding. Endorses feeling the baby move. Denies shortness of breath, dyspnea, headache, vision changes, abdominal surgery. Pregnancy has been uncomplicated. Previous two pregnancies were in [facility]. No complications during the pregnancies. OB History    Para Term  AB Living   3 2 2     2   SAB IAB Ectopic Molar Multiple Live Births             2      # Outcome Date GA Lbr Micheal/2nd Weight Sex Delivery Anes PTL Lv   3 Current            2 Term 13   9 lb (4.082 kg) M Vag-Spont   LENNY   1 Term 12/13/10   10 lb (4.536 kg) F Vag-Spont   LENNY     No past medical history on file. No past surgical history on file. Social History     Occupational History    Not on file   Tobacco Use    Smoking status: Never Smoker    Smokeless tobacco: Never Used   Substance and Sexual Activity    Alcohol use: Never    Drug use: Never    Sexual activity: Yes     Partners: Male      No family history on file. No Known Allergies  Prior to Admission medications    Medication Sig Start Date End Date Taking? Authorizing Provider   PNV EJ.33/KNYKPQW fum/folic ac (PRENATAL PO) Take  by mouth. Yes Provider, Historical        Review of Systems:  A comprehensive review of systems was negative except for that written in the History of Present Illness.      Objective: Vitals:    Vitals:    22 1128 22 1132   BP:  119/85   Pulse:  80   Resp:  16   Temp:  97.7 °F (36.5 °C)   SpO2:  99%   Weight: 163 lb 12.8 oz (74.3 kg)       Temp (24hrs), Av.7 °F (36.5 °C), Min:97.7 °F (36.5 °C), Max:97.7 °F (36.5 °C)    BP  Min: 119/85  Max: 119/85     Physical Exam:  Patient without distress. Heart: Regular rate and rhythm  Lung: clear to auscultation throughout lung fields, no wheezes, no rales, no rhonchi and normal respiratory effort  Abdomen: soft, nontender, without guarding, without rebound  Cervical Exam:   Cervical Exam  Dilation (cm): 4  Eff: 60 %  Station: -2  Vaginal exam done by? : Neil Steinberg RN     Repeat exam at 1330: 9/100/-2    Lower Extremities:  - Edema No     Membranes:  Intact  Uterine Activity:  Frequency: Every 6 mintues  Fetal Heart Rate:  Reactive  Baseline: 140 per minute     Variability: moderate  Accels: present  Decels: absent  Cat 1 FHT     Lab/Data Review:  No results found for this or any previous visit (from the past 24 hour(s)). Assessment and Plan:     Ms. Elidia Goldman is a 27 y.o.   female with an estimated gestational age of 41w10d by LMP c/w 14w3d scan who is on L&D for rule out of labor. G/C: neg. IUP: O+ Ab neg. 1 hr gtt: nml. HIV/RPR: NR. HBsAg screen: negative. AFP Tetra: negative. GBS: negative. Rule out of labor: Patient progressed from 4 cm to 9 cm. Currently in active labor and maurizio appropriately. Prenatal labs in media. - Anticipate      Transfer of care: at 37 weeks [facility]. S<D: US at 40 wweeks. EFW: 38th% nml      I have discussed the diagnosis with the patient and the intended plan as seen in the above orders. All questions were answered concerning future plans. I have discussed medication side effects and warnings with the patient as well.    Labor precautions discussed, including: Regular painful contractions, lasting for greater than one hour, taking your breath away; any vaginal bleeding; any leakage of fluid; or absent or decreased fetal movement. Call M.D. on call if any of these symptoms or signs occur. Jillian Matias MD  Family Medicine Resident    I saw and evaluated the patient, performing the key elements of the service. I discussed the findings, assessment and plan with the resident and agree with the resident's findings and plan as documented in the resident's note.     Evens Conroy CNM

## 2022-07-03 NOTE — PROGRESS NOTES
CNM  Progress Note     Patient: Claudia Schulte MRN: [id number]  SSN: [id number]    YOB: 1995  Age: 27 y.o. Sex: female        Subjective:   RN reports an increased amount of lochia with clots after fundal. CNM at the bedside to assess. Called for EVELYN Brice MD to assess as well. Objective:   Blood pressure 114/69, pulse 74, temperature 97.4 °F (36.3 °C), resp. rate 16, height 5' (1.524 m), weight 74.3 kg (163 lb 12.8 oz), last menstrual period 10/11/2021, SpO2 91 %, unknown if currently breastfeeding. Physical Exam  Constitutional:       Appearance: Normal appearance. Pulmonary:      Effort: Pulmonary effort is normal.   Abdominal:      Comments: Post gravid, tender to palpation, U-2   Genitourinary:     General: Normal vulva. Comments: Moderate to large lochia with clots present. Neurological:      Mental Status: She is alert. Psychiatric:         Mood and Affect: Mood normal.         Behavior: Behavior normal.     Ultrasound shows small amount of clot in the uterus    Manual removal x2- small to moderate amount of clot removed. No membranes or placenta fragments removed. Procedure:  After informed consent, Dr. Rosi Brice placed a Bakri balloon in the uterus instilling 200ml of saline. Patient was pre-medicated and tolerated the procedure well. Patient with small amount of blood return.      Meds given:   Second bag of pitocin   0.2 mg methergine  800 mcg cytotec DC   1 gram TXA x2    Assessment:     37w6d  Postpartum hemorrhage       Plan:   Bakri balloon for 12 hours, unless it comes out sooner  2 grams ancef every 8 hours until Bakri out  CBC  Continue surveillance of postpartum bleeding       Andrea Bah CNM

## 2022-07-04 PROCEDURE — 74011250636 HC RX REV CODE- 250/636: Performed by: NURSE PRACTITIONER

## 2022-07-04 PROCEDURE — 74011250637 HC RX REV CODE- 250/637: Performed by: FAMILY MEDICINE

## 2022-07-04 PROCEDURE — 74011000250 HC RX REV CODE- 250: Performed by: STUDENT IN AN ORGANIZED HEALTH CARE EDUCATION/TRAINING PROGRAM

## 2022-07-04 PROCEDURE — 74011250637 HC RX REV CODE- 250/637: Performed by: STUDENT IN AN ORGANIZED HEALTH CARE EDUCATION/TRAINING PROGRAM

## 2022-07-04 PROCEDURE — 65270000029 HC RM PRIVATE

## 2022-07-04 PROCEDURE — 74011000250 HC RX REV CODE- 250: Performed by: NURSE PRACTITIONER

## 2022-07-04 RX ORDER — IBUPROFEN 800 MG/1
800 TABLET ORAL
Status: DISCONTINUED | OUTPATIENT
Start: 2022-07-04 | End: 2022-07-05 | Stop reason: HOSPADM

## 2022-07-04 RX ADMIN — SODIUM CHLORIDE, PRESERVATIVE FREE 10 ML: 5 INJECTION INTRAVENOUS at 08:47

## 2022-07-04 RX ADMIN — IBUPROFEN 800 MG: 800 TABLET, FILM COATED ORAL at 17:17

## 2022-07-04 RX ADMIN — SODIUM CHLORIDE, PRESERVATIVE FREE 10 ML: 5 INJECTION INTRAVENOUS at 01:02

## 2022-07-04 RX ADMIN — SODIUM CHLORIDE, PRESERVATIVE FREE 10 ML: 5 INJECTION INTRAVENOUS at 01:01

## 2022-07-04 RX ADMIN — CEFAZOLIN 2 G: 1 INJECTION, POWDER, FOR SOLUTION INTRAMUSCULAR; INTRAVENOUS at 00:58

## 2022-07-04 RX ADMIN — IBUPROFEN 800 MG: 800 TABLET, FILM COATED ORAL at 08:47

## 2022-07-04 NOTE — PROGRESS NOTES
0830- Pt ambulated with this RN to bathroom. Tolerated well no c/o dizziness. Voided immediately 700 mls. Two quarter sized clots expelled when pt stood up after voiding. No additional bleeding noted. Returned to bed, patients fundal check was U-1 firm with scant bleeding at this time.

## 2022-07-04 NOTE — ROUTINE PROCESS
1900  Bedside and Verbal shift change report given to SARATH Weber RN (oncoming nurse) by Clemente Devine RN (offgoing nurse). Report included the following information SBAR, Kardex, Procedure Summary, Intake/Output, MAR, Recent Results and Med Rec Status. 1930  Pad change 44 ml  2030  Pad change 16 ml.  2050  Updated Dr. Will Fernandes and Hien Cordoba,  about patient progress  0210  Clarified note to remove Bakri after 12 hours with C Bradudhi. Per EDUARDO Pack, remove very slowly after 12 hours of placement. 3196  Bakri balloon deflation started. 0445  Bakri out.  0450  Cloud catheter removed, 575 ml urine emptied. Total  ml  0455  Dr. Will Fernandes updated on patient progress. 0320  Verbal shift change report given to Sylvia Acuña RN (oncoming nurse) by Gilson Hearn RN (offgoing nurse). Report included the following information SBAR, Kardex, Procedure Summary, Intake/Output, MAR, Recent Results and Med Rec Status.

## 2022-07-04 NOTE — PROGRESS NOTES
1900- Bedside shift change report given to SUNDEEP Turner RN &^ RAGINI San RN (oncoming nurse) by Ana Luisa Norton RNC (offgoing nurse). Report included the following information SBAR, Intake/Output, MAR and Recent Results.

## 2022-07-04 NOTE — PROGRESS NOTES
Problem: Vaginal Delivery: Day of Delivery-Post delivery  Goal: Off Pathway (Use only if patient is Off Pathway)  Outcome: Progressing Towards Goal     Problem: Vaginal Delivery: Day of Delivery-Post delivery  Goal: Activity/Safety  Outcome: Progressing Towards Goal     Problem: Vaginal Delivery: Day of Delivery-Post delivery  Goal: Consults, if ordered  Outcome: Progressing Towards Goal     Problem: Vaginal Delivery: Day of Delivery-Post delivery  Goal: Nutrition/Diet  Outcome: Progressing Towards Goal     Problem: Vaginal Delivery: Day of Delivery-Post delivery  Goal: Discharge Planning  Outcome: Progressing Towards Goal     Problem: Vaginal Delivery: Day of Delivery-Post delivery  Goal: Medications  Outcome: Progressing Towards Goal     Problem: Vaginal Delivery: Day of Delivery-Post delivery  Goal: Treatments/Interventions/Procedures  Outcome: Progressing Towards Goal     Problem: Vaginal Delivery: Day of Delivery-Post delivery  Goal: *Vital signs within defined limits  Outcome: Progressing Towards Goal     Problem: Vaginal Delivery: Day of Delivery-Post delivery  Goal: *Optimal pain control at patient's stated goal  Outcome: Progressing Towards Goal

## 2022-07-04 NOTE — PROGRESS NOTES
TRANSFER - OUT REPORT:    Verbal report given to Sinan Hernandez RN(name) on Nita Rowland  being transferred to MIU(unit) for routine progression of care       Report consisted of patients Situation, Background, Assessment and   Recommendations(SBAR). Information from the following report(s) SBAR, Kardex, Procedure Summary, Intake/Output, MAR and Recent Results was reviewed with the receiving nurse. Lines:   Peripheral IV 07/03/22 Posterior;Right Hand (Active)   Site Assessment Clean, dry, & intact 07/04/22 0723   Phlebitis Assessment 0 07/04/22 0723   Infiltration Assessment 0 07/04/22 0723   Dressing Status Clean, dry, & intact 07/04/22 0723   Dressing Type Transparent;Tape 07/04/22 0723   Hub Color/Line Status Pink 07/04/22 0723       Peripheral IV 07/03/22 Left;Posterior Hand (Active)   Site Assessment Clean, dry, & intact 07/04/22 0723   Phlebitis Assessment 0 07/04/22 0723   Infiltration Assessment 0 07/04/22 0723   Dressing Status Clean, dry, & intact 07/04/22 0723   Dressing Type Transparent;Tape 07/04/22 0723   Hub Color/Line Status Pink 07/04/22 0723        Opportunity for questions and clarification was provided.       Patient transported with:   Registered Nurse

## 2022-07-04 NOTE — PROGRESS NOTES
[address] [facility] [place] [place]    Office [phone], Fax [phone]                                Post-Partum Day Number 1 Progress Note    Patient doing well post-partum without significant complaint. Pain well controlled. Lochia nml. Has not eaten yet. Ambulating. Voiding without difficulty. No flatus. No BM.       Vitals:  Patient Vitals for the past 8 hrs:   BP Temp Pulse Resp SpO2   07/04/22 0723 95/63 99 °F (37.2 °C) 85 16 98 %   07/04/22 0639 -- -- -- -- 95 %   07/04/22 0634 -- -- -- -- 98 %   07/04/22 0629 -- -- -- -- 96 %   07/04/22 0624 -- -- -- -- 96 %   07/04/22 0619 -- -- -- -- 95 %   07/04/22 0614 -- -- -- -- 97 %   07/04/22 0613 98/63 -- 75 -- --   07/04/22 0609 -- -- -- -- 96 %   07/04/22 0604 -- -- -- -- 97 %   07/04/22 0559 -- -- -- -- 96 %   07/04/22 0554 -- -- -- -- 95 %   07/04/22 0549 -- -- -- -- 96 %   07/04/22 0544 -- -- -- -- 96 %   07/04/22 0539 -- -- -- -- 96 %   07/04/22 0534 -- -- -- -- 97 %   07/04/22 0529 -- -- -- -- 95 %   07/04/22 0524 -- -- -- -- 97 %   07/04/22 0519 -- -- -- -- 97 %   07/04/22 0514 -- -- -- -- 98 %   07/04/22 0513 100/77 -- (!) 57 -- --   07/04/22 0509 -- -- -- -- 97 %   07/04/22 0504 -- -- -- -- 98 %   07/04/22 0459 -- -- -- -- 98 %   07/04/22 0454 -- -- -- -- 97 %   07/04/22 0449 -- 98.2 °F (36.8 °C) -- -- 99 %   07/04/22 0444 -- -- -- -- 99 %   07/04/22 0439 -- -- -- -- 98 %   07/04/22 0434 -- -- -- -- 98 %   07/04/22 0429 -- -- -- -- 98 %   07/04/22 0424 -- -- -- -- 97 %   07/04/22 0419 -- -- -- -- 97 %   07/04/22 0414 -- -- -- -- 97 %   07/04/22 0413 97/67 -- 80 -- --   07/04/22 0409 -- -- -- -- 96 %   07/04/22 0404 -- -- -- -- 96 %   07/04/22 0359 -- -- -- -- 97 %   07/04/22 0354 -- -- -- -- 96 %   07/04/22 0349 -- -- -- -- 98 %   07/04/22 0344 -- -- -- -- 95 %   07/04/22 0339 -- -- -- -- 96 %   07/04/22 0334 -- -- -- -- 96 %   07/04/22 0329 -- -- -- -- 95 %   07/04/22 0324 -- -- -- -- 96 %   07/04/22 0319 -- -- -- -- 97 % 22 -- -- -- -- 96 %   22 103/66 -- 72 -- --   22 -- -- -- -- 96 %   22 -- -- -- -- 97 %   22 -- -- -- -- 97 %   22 -- -- -- -- 98 %   22 -- -- -- -- 96 %   22 -- -- -- -- 98 %   22 -- -- -- -- 98 %   22 -- -- -- -- 97 %   22 -- -- -- -- 97 %   22 -- -- -- -- 98 %   22 -- -- -- -- 97 %   22 -- -- -- -- 97 %   22 109/67 -- 78 -- --   22 -- -- -- -- 98 %   22 -- -- -- -- 97 %   22 -- -- -- -- 97 %   22 -- -- -- -- 96 %   22 -- -- -- -- 97 %   22 -- -- -- -- 96 %   22 -- -- -- -- 96 %   22 -- -- -- -- 96 %   22 -- -- -- -- 96 %   22 -- -- -- -- 97 %   22 -- -- -- -- 96 %   22 -- -- -- -- 97 %   22 111/66 -- 69 -- --   22 -- -- -- -- 96 %   22 -- -- -- -- 97 %   22 -- -- -- -- 97 %   22 -- -- -- -- 97 %   22 -- -- -- -- 97 %   22 -- -- -- -- 98 %   22 -- -- -- -- 97 %   22 -- -- -- -- 97 %   22 -- -- -- -- 97 %     Temp (24hrs), Av.2 °F (36.8 °C), Min:97.4 °F (36.3 °C), Max:99 °F (37.2 °C)      Exam:  Patient without distress. CTAB, no w/r/r/c.               RRR, +S1 and S2, no m/r/g. Abdomen soft, fundus firm at level of umbilicus, mildly tender. Perineum with normal lochia noted. Lower extremities:  No edema. No palpable cords or tenderness. Lab/Data Review:  No results found for this or any previous visit (from the past 12 hour(s)). Assessment and Plan:    Armando Tavarez is a 32 y.o.  s/p  at 37 weeks 6 days. Pregnancy was complicated by size less than dates, resolved by 37 week ultrasound.  Delivery complicated by PPH with QBL 1198cc, received 2nd bag of pitocin, methergine, cytotec, TXAx2, and Bakri balloon which is now out. Also received ancef while bakri was in.    1. Will continue to monitor closely for signs of continued bleeding. Patient stable and appears to have resolved. 2. Continue routine perineal care and maternal education. 3. Plan discharge tomorrow if no problems occur. Patient discussed with Dr. Dariana Benavidez.                  Florencio Patel DO  Family Medicine Resident

## 2022-07-05 VITALS
TEMPERATURE: 98.6 F | RESPIRATION RATE: 16 BRPM | HEART RATE: 82 BPM | SYSTOLIC BLOOD PRESSURE: 104 MMHG | DIASTOLIC BLOOD PRESSURE: 66 MMHG | BODY MASS INDEX: 32.16 KG/M2 | OXYGEN SATURATION: 98 % | WEIGHT: 163.8 LBS | HEIGHT: 60 IN

## 2022-07-05 PROCEDURE — 99238 HOSP IP/OBS DSCHRG MGMT 30/<: CPT | Performed by: FAMILY MEDICINE

## 2022-07-05 PROCEDURE — 74011250637 HC RX REV CODE- 250/637: Performed by: FAMILY MEDICINE

## 2022-07-05 RX ORDER — DOCUSATE SODIUM 100 MG/1
100 CAPSULE, LIQUID FILLED ORAL DAILY
Qty: 20 CAPSULE | Refills: 0 | Status: SHIPPED | OUTPATIENT
Start: 2022-07-05 | End: 2022-07-25

## 2022-07-05 RX ORDER — IBUPROFEN 800 MG/1
800 TABLET ORAL
Qty: 30 TABLET | Refills: 0 | Status: SHIPPED | OUTPATIENT
Start: 2022-07-05

## 2022-07-05 RX ADMIN — IBUPROFEN 800 MG: 800 TABLET, FILM COATED ORAL at 09:50

## 2022-07-05 RX ADMIN — IBUPROFEN 800 MG: 800 TABLET, FILM COATED ORAL at 01:23

## 2022-07-05 NOTE — DISCHARGE INSTRUCTIONS
Patient Discharge Instructions    Inez Stewart / [id number] : 1995    Admitted 7/3/2022 Discharged: 2022       Por favor tenga mary documento presente en mullins skip de seguimiento con mullins médico primario. Primary care provider:  Unknown, Provider, PA    Discharging provider:  Mariluz De Leon, DO  - Family Medicine Resident  Katalina Anand, DO -  Family medicine attending          [address]:  Labor and delivery, indication for care [O75.9]      RECOMENDACIONES DE CUIDADO:     Follow-up Information     Follow up With Specialties Details Why Contact Info    [facility]   La clinica va a llamar usted, si no llame por favor hace loren skip postparto [address] [facility] [address] [facility] [address]          Continue Tratamiento:  - Por favor continue Motrin 800 mg, 1 tableta cada 8 horas por los próximos 2 días, luego 1 tableta cada 8 horas mientras sea necesario para el dolor. .  - Por favor continue Colace 100 mg para el estreñimiento, tome 1 tableta dos veces al día hasta que pueda defercar regularmente sin necesidad del medicamento. - Por favor continue tomando jax vitaminas prenatales. Pruebas de seguimiento que necesita: ninguna    Resultados pendientes:   En el momento de mullins de kira los Richardson de las siguientes pruebas están pendiente:  ninguna. Por favor discuta estos resultados con mullins proveedor primario en mullins skip de seguimiento. Importante que [employer] siguientes síntomas: dolor de [place], falta de [place], [place], escalofríos, náusea, vómito, diarrea, cambios en mullins estado mental, caídas, debilidad y sangrado. DIETA/que comer:   Regular    ACTIVIDAD FÍSICA:   Instrucciones de [facility]    No levante nada que sea más pesado que mullins bebé por las próximas 6 semanas. No insertar nada por la vaginal por 6 semanas. Luego del parto por cesárea/ vaginal debe evitar tener relaciones sexuales por 6 semanas. Tendrá mullins skip de seguimiento posparto a las 6 semanas. Puede manejar mullins vehículo mientras no esté tomando Percocet ni ningún medicamento nárcotico (Motrin está marco). Cuidado de Herida:  llene el gregg bottle con agua tibia y exprima hasta que salga un chorro de agua por la boquilla para ayudarle a limpiar el área perineal.      Entiendo que si surge algún problema cuando llegue a mi hogar puedo contactar a mi médico.    Me barajas explicado las siguientes instrucciones y barajas aclarado mis dudas. Entiendo y reconozco las instrucciones brindadas.                                                                                                                                                Firma de Elicia Agustin / Beverly Jeffersonua                                                                 Fecha/Hora                                                                                                                                              Firma de paciente ó representante                                                         Fecha/Hora

## 2022-07-05 NOTE — DISCHARGE SUMMARY
Obstetrical Discharge Summary     Name: Juana Givens MRN: [id number]  SSN: [id number]    YOB: 1995  Age: 32 y.o. Sex: female      Admit Date: 7/3/2022    Discharge Date: 2022     Admitting Physician: Stephen Carrion MD     Attending Physician:  Everardo Mancini DO     Admission Diagnoses: Labor and delivery, indication for care [O75.9]    Discharge Diagnoses:   Information for the patient's :  Khari Bills Female [[id number]]   Delivery of a 2.85 kg female infant via Vaginal, Spontaneous on 7/3/2022 at 2:01 PM  by Princess Renee. Apgars were 9  and 9 . Additional Diagnoses:   Hospital Problems  Date Reviewed: 2022          Codes Class Noted POA    Labor and delivery, indication for care ICD-10-CM: O75.9  ICD-9-CM: 659.90  7/3/2022 Unknown             Lab Results   Component Value Date/Time    Rubella, External Immune 2021 12:00 AM    GrBStrep, External negative 2022 12:00 AM       Immunization(s):   Immunization History   Administered Date(s) Administered    COVID-19, MODERNA BLUE border, Primary or Immunocompromised, (age 18y+), IM, 100 mcg/0.5mL 10/28/2021, 2021    COVID-19, MODERNA Booster BLUE border, (age 18y+), IM, 50mcg/0.25mL 2022    Tdap 2022        Hospital Course:   Patient is a 32 y.o. [id number] s/p  at 37 weeks 6 days. Presented for  Active Labor. Pregnancy complicated by size less than dates which resolved at 37 week U/S. Labor was uncomplicated. .  Delivered TLFI by  without complications. Hospital course complicated following the delivery by [facility] with 1,198 CC qbl. Pt received a second bag of pit, methergine, cyotec, TXA, and Bakri balloon, subsequently stable. On day of discharge patient reported minimal lochia, well controlled pain, and no other complaints. Discharged with pain regimen and bowel regimen. Advised to continue prenatal vitamins. [facility] up [facility] in 6 weeks.       Depression Scale           Follow up test at discharge: None  Condition at Discharge:  Stable  Disposition: Discharge to Home    Physical exam:  Visit Vitals  /66 (BP 1 Location: Left upper arm, BP Patient Position: At rest)   Pulse 82   Temp 98.6 °F (37 °C)   Resp 16   Ht 5' (1.524 m)   Wt 74.3 kg (163 lb 12.8 oz)   LMP 10/11/2021 (Exact Date)   SpO2 98%   Breastfeeding Unknown   BMI 31.99 kg/m²       Exam:  Patient without distress. CTAB, no w/r/r/c               RRR, + S1 and S2, no m/r/g               Abdomen soft, fundus firm at level of umbilicus, non tender               Perineum with normal lochia noted. Lower extremities are negative for swelling, cords or tenderness. Patient Instructions:   Current Discharge Medication List      START taking these medications    Details   ibuprofen (MOTRIN) 800 mg tablet Take 1 Tablet by mouth every eight (8) hours as needed for Pain for up to 30 doses. Qty: 30 Tablet, Refills: 0      docusate sodium (Colace) 100 mg capsule Take 1 Capsule by mouth daily for 20 doses. Qty: 20 Capsule, Refills: 0         CONTINUE these medications which have NOT CHANGED    Details   PNV EU.80/VHSOAWX fum/folic ac (PRENATAL PO) Take  by mouth. Reference my discharge instructions.     Follow-up Information     Follow up With Specialties Details Why [address] va a llamar usted, si no llame por favor martha Da Silva Jyoti [address]  [place] [phone]            Signed By:  Khushi Adan DO    Family Medicine Resident

## 2022-07-05 NOTE — PROGRESS NOTES
[address] [facility] [address] [address]   Office [phone], Fax [phone]                                Post-Partum Day Number 2 Progress/Discharge Note    Patient doing well post-partum without significant complaint. Pain well controlled. Lochia nml. Tolerating regular diet. Ambulating. Voiding without difficulty. Vitals:   Temp (24hrs), Av.5 °F (36.9 °C), Min:98.1 °F (36.7 °C), Max:98.8 °F (37.1 °C)      Vital signs stable, afebrile. Exam:  Patient without distress. CTAB, no w/r/r/c               RRR, + S1 and S2, no m/r/g               Abdomen soft, fundus firm at level of umbilicus, mildly tender in RLQ               Perineum with normal lochia noted. Lower extremities are negative for swelling, cords or tenderness. Lab/Data Review:  No results found for this or any previous visit (from the past 12 hour(s)). Assessment and Plan:      Jean-Pierre Gallegos is a 32 y.o.  s/p  at 37 weeks 6 days. Pregnancy was complicated by [address] and now stable with last hgb 13.1 Patient appears to be having uncomplicated post-partum course. 1. Continue routine perineal care and maternal education. 2. Plan discharge for today with follow up in [facility] office in 6 weeks.     Patient discussed with Dr. Omayra Gallardo, DO  Family Medicine Resident

## 2022-07-05 NOTE — LACTATION NOTE
This note was copied from a baby's chart. **Greek-speaking mother. Video  used. Baby asleep in crib. Mother states that her milk has not come in, but she plans to feed baby breastmilk and formula at home. She states that the baby is fussy after feeding at the breast, like she is still hungry. Discussed expected lactogenesis and engorgement guidelines. She does not have a pump at home, but is enrolled in [address] [place] [address] . Encouraged mother to call today or tomorrow. Hand pump given with demonstration. Encouraged mother to stimulate her breasts every 2-3 hours until she desires to latch baby again. Output adequate for baby. Breast Assessment  Left Breast: Medium  Left Nipple: Intact,Short  Right Breast: Medium  Right Nipple: Intact,Short  Breast- Feeding Assessment  Attends Breast-Feeding Classes: No  Breast-Feeding Experience: No  Breast Trauma/Surgery: No  Type/Quality: Attempted  Lactation Consultant Visits  Breast-Feedings: Attempted breast-feeding  Mother/Infant Observation  Mother Observation: Recognizes feeding cues  Infant Observation:  (Mother states that baby appeared hungry after breastfeeding, so she is mainly formula feeding until her mature milk comes in)  [facility] Documentation  Latch:  (Baby asleep. Not assessed. Mother not interested in feeding at the breast until she gets home.)     Engorgement Care Guidelines:  Reviewed how milk is made and normal phases of milk production. Taught care of engorged breasts - frequent breastfeeding encouraged, cool packs and motrin as tolerated. Anticipatory guidance shared. Reviewed breastfeeding basics:  How milk is made and normal  breastfeeding behaviors discussed. Supply and demand,  stomach size, early feeding cues, skin to skin bonding with comfortable positioning and baby led latch-on reviewed.   How to identify signs of successful breastfeeding sessions reviewed; education on asymetrical latch, signs of effective latching vs shallow, in-effective latching, normal  feeding frequency and duration and expected infant output discussed. Normal course of breastfeeding discussed including the AAP's recommendation that children receive exclusive breast milk feedings for the first six months of life with breast milk feedings to continue through the first year of life and/or beyond as complimentary table foods are added. Breastfeeding Booklet and Warm line information provided with discussion. Discussed typical  weight loss and the importance of pediatrician appointment within 24-48 hours of discharge, at 2 weeks of life and normalcy of requesting pediatric weight checks as needed in between visits. Engorgement Care Guidelines:  Reviewed how milk is made and normal phases of milk production. Taught care of engorged breasts - frequent breastfeeding encouraged, cool packs and motrin as tolerated. Anticipatory guidance shared. Discussed eating a healthy diet. Instructed mother to eat a variety of foods in order to get a well balanced diet. She should consume an extra 500 calories per day (more than her non-pregnant requirement.) These extra calories will help provide energy needed for optimal breast milk production. Mother also encouraged to \"drink to thirst\" and it is recommended that she drink fluids such as water, fruit/vegetable juice. Nutritious snacks should be available so that she can eat throughout the day to help satisfy her hunger and maintain a good milk supply.

## 2022-07-06 ENCOUNTER — ROUTINE PRENATAL (OUTPATIENT)
Dept: FAMILY MEDICINE CLINIC | Age: 27
End: 2022-07-06

## 2022-08-17 ENCOUNTER — OFFICE VISIT (OUTPATIENT)
Dept: FAMILY MEDICINE CLINIC | Age: 27
End: 2022-08-17

## 2022-08-17 VITALS
SYSTOLIC BLOOD PRESSURE: 93 MMHG | HEART RATE: 83 BPM | RESPIRATION RATE: 16 BRPM | OXYGEN SATURATION: 96 % | DIASTOLIC BLOOD PRESSURE: 57 MMHG

## 2022-08-17 DIAGNOSIS — N89.8 VAGINAL DISCHARGE: Primary | ICD-10-CM

## 2022-08-17 DIAGNOSIS — N76.0 BACTERIAL VAGINOSIS: ICD-10-CM

## 2022-08-17 DIAGNOSIS — B96.89 BACTERIAL VAGINOSIS: ICD-10-CM

## 2022-08-17 LAB — WET MOUNT POCT, WMPOCT: NORMAL

## 2022-08-17 PROCEDURE — 87210 SMEAR WET MOUNT SALINE/INK: CPT | Performed by: STUDENT IN AN ORGANIZED HEALTH CARE EDUCATION/TRAINING PROGRAM

## 2022-08-17 RX ORDER — METRONIDAZOLE 500 MG/1
500 TABLET ORAL 2 TIMES DAILY
Qty: 14 TABLET | Refills: 0 | Status: SHIPPED | OUTPATIENT
Start: 2022-08-17 | End: 2022-08-24

## 2022-08-17 RX ORDER — FLUCONAZOLE 100 MG/1
100 TABLET ORAL ONCE
Qty: 1 TABLET | Refills: 0 | Status: SHIPPED | OUTPATIENT
Start: 2022-08-25 | End: 2022-08-25

## 2022-08-17 NOTE — PROGRESS NOTES
"  OT General Care Plan  OT Goal 1  Will develop insightful ideas for coping strategies and identify symptoms of when using them would be beneficial.      Pt attended 2 out of 2 OT groups offered. Pt actively participated in occupational therapy clinic. Pt was able to ask for assistance with prompting, and returned to a goal-directed, visual scanning task with support and encouragement. She demonstrated and verbalized difficulty with concentrating, and repeatedly stated \"I can't do this.\" She appeared more tearful in group today compared to yesterday, and stated \"I'm scared.\" She further explained: \"I'm scared I'll never get better.\" She was more successful with focusing on her task with 1-on-1 support and assistance, though was patient and worked independently when writer needed to assist other group members. She appeared more anxious today in comparison to yesterday. Will continue to assess.       " Eliud BV. Rx sent for Flagyl. Discussed with patient.

## 2022-08-17 NOTE — PROGRESS NOTES
Chief Complaint   Patient presents with    Post-Partum Care     1. Have you been to the ER, urgent care clinic since your last visit? Hospitalized since your last visit? No    2. Have you seen or consulted any other health care providers outside of the [address] [place] [address] since your last visit? Include any pap smears or colon screening.  No

## 2022-08-17 NOTE — PROGRESS NOTES
[address] [place] [address]   Office [phone], Fax [phone]    Subjective:     Chief Complaint   Patient presents with    Post-Partum Care     History provided by patient     6 Week Post Partum Note    32 y.o. female , presenting for 6 week postpartum visit s/p  at 37w6d. Pregnancy c/b by S<D resolved at 37 week US. Hospital course complicated by PPH (4809 QBL) s/p pitocin, methergine, cytotec, TXA, and Bakri balloon. Post CBC 14.3. Stable at discharge. Patient doing well post-partum without significant complaint. Lochia: abnormal moderate yellow discharge with odor (2 pad/d)   Pain: persistent pelvic pain, has not trialed medications since the hospital. Feels able to tolerate   Baby: doing well, has seen PCP  Sexual activity: Has resumed sexual activity   Plan for contraception: Started Micronor tablets two weeks ago for contraception   Breast/bottle: Breastfeeding only, doing well   Support from FOB/family: YES   Symptoms of depression:   [place] Depression Scale Score: 5  <8= depression not likely continue support   9-11= depression possible, support and re-screen in 2-4 weeks  12-13= high possibility of depression, monitor, support and offer education  14 and higher= probable depression, treat     No fevers/chills, dysuria, frequency, urgency   [facility]t [facility]. SocHx. - Denies smoking, alcohol use, illcit drug use  - Sexually active: Yes  - Occupation: Staying at home     Review of Systems   Constitutional:         Complete ROS performed, negative except as noted in HPI   All other systems reviewed and are negative. Objective:     Visit Vitals  BP (!) 93/57 (BP 1 Location: Left upper arm, BP Patient Position: Sitting, BP Cuff Size: Adult)   Pulse 83   Resp 16   SpO2 96%           Exam:  Patient without distress. Abdomen soft, fundus firm at level of umbilicus, nontender. Lower extremities:  No edema.  No palpable cords or tenderness. : Yellow appearing discharge noted, otherwise normal exam.    Chaperone present for exam.       Pertinent Labs/Studies:       Assessment and orders:     Assessment and Plan:    Hood Yee is a 32 y.o.  s/p  at 37 weeks 6 days. Here with new onset odorous vaginal discharge. Wet prep completed significant for clue cells and WBCs. Continue routine care  Call clinic or make appointment for symptoms of sadness  Follow up for yearly well woman exam.    Bacterial Vaginosis: Flagyl 500mg BID x7 days       ICD-10-CM ICD-9-CM    1. Vaginal discharge  N89.8 623.5 AMB POC SMEAR, STAIN & INTERPRET, WET MOUNT      2. Bacterial vaginosis  N76.0 616.10 metroNIDAZOLE (FLAGYL) 500 mg tablet    B96.89 041.9               Follow Up:1 year for annual well woman      Pt was discussed with Dr Lynne Cadena (attending physician). I have reviewed patient medical and social history and medications. I have reviewed pertinent labs results and other data. I have discussed the diagnosis with the patient and the intended plan as seen in the above orders. The patient has received an after-visit summary and questions were answered concerning future plans. I have discussed medication side effects and warnings with the patient as well.     Adrianne Nair MD  [facility]

## 2022-09-29 DIAGNOSIS — N94.89 SUPPRESSION OF MENSES: Primary | ICD-10-CM

## 2022-09-29 RX ORDER — ACETAMINOPHEN AND CODEINE PHOSPHATE 120; 12 MG/5ML; MG/5ML
1 SOLUTION ORAL DAILY
Qty: 30 TABLET | Refills: 3 | Status: SHIPPED | OUTPATIENT
Start: 2022-09-29

## 2022-09-29 NOTE — PROGRESS NOTES
Received message from [employer], prenatal provider, that patient without any additional refills for Micronor. Will send in 6-month supply. Should have additional appointment at [facility] or [facility] for additional refills.       Yon Merino, [address] [facility]  9/29/2022